# Patient Record
Sex: MALE | Race: WHITE | Employment: FULL TIME | URBAN - METROPOLITAN AREA
[De-identification: names, ages, dates, MRNs, and addresses within clinical notes are randomized per-mention and may not be internally consistent; named-entity substitution may affect disease eponyms.]

---

## 2024-04-13 ENCOUNTER — OFFICE VISIT (OUTPATIENT)
Age: 39
End: 2024-04-13

## 2024-04-13 VITALS
OXYGEN SATURATION: 95 % | TEMPERATURE: 98 F | DIASTOLIC BLOOD PRESSURE: 66 MMHG | WEIGHT: 190 LBS | RESPIRATION RATE: 18 BRPM | HEIGHT: 72 IN | SYSTOLIC BLOOD PRESSURE: 100 MMHG | BODY MASS INDEX: 25.73 KG/M2 | HEART RATE: 83 BPM

## 2024-04-13 DIAGNOSIS — R50.9 FEVER, UNSPECIFIED FEVER CAUSE: ICD-10-CM

## 2024-04-13 DIAGNOSIS — J18.9 ATYPICAL PNEUMONIA: Primary | ICD-10-CM

## 2024-04-13 DIAGNOSIS — R52 BODY ACHES: ICD-10-CM

## 2024-04-13 DIAGNOSIS — R05.1 ACUTE COUGH: ICD-10-CM

## 2024-04-13 PROBLEM — M54.2 NECK PAIN: Status: ACTIVE | Noted: 2019-10-29

## 2024-04-13 PROBLEM — R00.2 HEART PALPITATIONS: Status: ACTIVE | Noted: 2018-06-12

## 2024-04-13 PROBLEM — M24.20 EAGLE'S SYNDROME: Status: ACTIVE | Noted: 2019-10-29

## 2024-04-13 PROBLEM — I45.9 SKIPPED HEART BEATS: Status: ACTIVE | Noted: 2018-06-12

## 2024-04-13 LAB
INFLUENZA A ANTIGEN, POC: NEGATIVE
INFLUENZA B ANTIGEN, POC: NEGATIVE

## 2024-04-13 RX ORDER — DOXYCYCLINE HYCLATE 100 MG
100 TABLET ORAL 2 TIMES DAILY
Qty: 20 TABLET | Refills: 0 | Status: SHIPPED | OUTPATIENT
Start: 2024-04-13 | End: 2024-04-23

## 2024-04-13 RX ORDER — ALBUTEROL SULFATE 90 UG/1
2 AEROSOL, METERED RESPIRATORY (INHALATION) 4 TIMES DAILY PRN
Qty: 18 G | Refills: 0 | Status: SHIPPED | OUTPATIENT
Start: 2024-04-13

## 2024-04-13 NOTE — PROGRESS NOTES
Rudy Clemens (:  1985) is a 38 y.o. male,New patient, here for evaluation of the following chief complaint(s):  Congestion (Chest congestion started a little over a week ago, could hear it and feel it. Yesterday sx worsening, fever coughing up pink mucus. Loss of appetite, having some body aches as well. )      ASSESSMENT/PLAN:    ICD-10-CM    1. Atypical pneumonia  J18.9 doxycycline hyclate (VIBRA-TABS) 100 MG tablet      2. Body aches  R52 POCT Influenza A/B Antigen (BD Veritor)     XR CHEST STANDARD (2 VW)      3. Fever, unspecified fever cause  R50.9 XR CHEST STANDARD (2 VW)      4. Acute cough  R05.1 XR CHEST STANDARD (2 VW)          Initial xray: Left lower lobe patchy haziness possible pneumonia vs. atelectasis will call with radiologist results if they are different from the initial results   Take medicaton as prescribed. Reviewed increasing water intake, sleeping in an elevated position to aid post nasal drip, using a cool mist humidifier,covering cough and proper hand hygiene.    Follow up with your pcp in 7 days if symptoms persist or if symptoms worsen.    SUBJECTIVE/OBJECTIVE:    History provided by:  Patient   used: No      HPI:   38 y.o. male presents with symptoms of cough,congestion, fever and body aches started 1 week ago but now getting worse.. Denies chest pain, vomiting.     Vitals:    24 0816   BP: 100/66   Site: Left Upper Arm   Position: Sitting   Cuff Size: Large Adult   Pulse: 83   Resp: 18   Temp: 98 °F (36.7 °C)   TempSrc: Oral   SpO2: 95%   Weight: 86.2 kg (190 lb)   Height: 1.829 m (6')       Review of Systems    Physical Exam  Vitals and nursing note reviewed.   Constitutional:       Appearance: Normal appearance.   HENT:      Head: Normocephalic.      Right Ear: Hearing, tympanic membrane, ear canal and external ear normal.      Left Ear: Hearing, tympanic membrane, ear canal and external ear normal.      Nose: Nose normal.      Right Sinus: No

## 2024-04-13 NOTE — PATIENT INSTRUCTIONS
Initial xray: Left lower lobe patchy haziness possible pneumonia vs. atelectasis will call with radiologist results if they are different from the initial results   Take medicaton as prescribed. Reviewed increasing water intake, sleeping in an elevated position to aid post nasal drip, using a cool mist humidifier,covering cough and proper hand hygiene.    Follow up with your pcp in 7 days if symptoms persist or if symptoms worsen.  New Prescriptions    ALBUTEROL SULFATE HFA (VENTOLIN HFA) 108 (90 BASE) MCG/ACT INHALER    Inhale 2 puffs into the lungs 4 times daily as needed for Wheezing    DOXYCYCLINE HYCLATE (VIBRA-TABS) 100 MG TABLET    Take 1 tablet by mouth 2 times daily for 10 days     w